# Patient Record
Sex: MALE | Race: WHITE | NOT HISPANIC OR LATINO | Employment: FULL TIME | URBAN - METROPOLITAN AREA
[De-identification: names, ages, dates, MRNs, and addresses within clinical notes are randomized per-mention and may not be internally consistent; named-entity substitution may affect disease eponyms.]

---

## 2018-07-27 ENCOUNTER — CLINICAL SUPPORT (OUTPATIENT)
Dept: OCCUPATIONAL MEDICINE | Facility: CLINIC | Age: 28
End: 2018-07-27

## 2018-07-27 PROCEDURE — 99499 UNLISTED E&M SERVICE: CPT | Mod: S$GLB,,, | Performed by: PREVENTIVE MEDICINE

## 2019-12-01 ENCOUNTER — HOSPITAL ENCOUNTER (EMERGENCY)
Facility: HOSPITAL | Age: 29
Discharge: HOME | End: 2019-12-01
Attending: EMERGENCY MEDICINE
Payer: COMMERCIAL

## 2019-12-01 VITALS
TEMPERATURE: 98 F | OXYGEN SATURATION: 99 % | SYSTOLIC BLOOD PRESSURE: 145 MMHG | DIASTOLIC BLOOD PRESSURE: 73 MMHG | WEIGHT: 225 LBS | HEART RATE: 75 BPM | BODY MASS INDEX: 33.33 KG/M2 | RESPIRATION RATE: 16 BRPM | HEIGHT: 69 IN

## 2019-12-01 DIAGNOSIS — L23.7 POISON OAK DERMATITIS: Primary | ICD-10-CM

## 2019-12-01 PROCEDURE — 63600175 PHARM REV CODE 636 W HCPCS: Mod: ER | Performed by: NURSE PRACTITIONER

## 2019-12-01 PROCEDURE — 99284 EMERGENCY DEPT VISIT MOD MDM: CPT | Mod: ER

## 2019-12-01 RX ORDER — TRIAMCINOLONE ACETONIDE 1 MG/G
OINTMENT TOPICAL 2 TIMES DAILY
Qty: 30 G | Refills: 0 | Status: SHIPPED | OUTPATIENT
Start: 2019-12-01

## 2019-12-01 RX ORDER — PREDNISONE 10 MG/1
40 TABLET ORAL DAILY
Qty: 120 TABLET | Refills: 1 | Status: SHIPPED | OUTPATIENT
Start: 2019-12-01 | End: 2019-12-06

## 2019-12-01 RX ORDER — PREDNISONE 20 MG/1
40 TABLET ORAL
Status: COMPLETED | OUTPATIENT
Start: 2019-12-01 | End: 2019-12-01

## 2019-12-01 RX ADMIN — PREDNISONE 40 MG: 20 TABLET ORAL at 11:12

## 2019-12-01 NOTE — ED PROVIDER NOTES
Encounter Date: 12/1/2019       History     Chief Complaint   Patient presents with    Rash     Pt reports poison ivy or poison oak to upper extremities and abdomen x3 days.     28 y/o male presents to the ED with complaints of poison oak rash to BUE and abdomen for 3 days.  Patient states he was in the woods and was exposed.  He has had poison ivy and oak in the past and states this rash is similar and usually needs systemic steroids.  Patient denies fever, chest pain, SOB, numbness, weakness, tingling, abdominal pain, back pain, dysuria, hematuria, nausea, vomiting, diarrhea, or any other complaints.  He has been applying OTC topical treatment with no relief.  No aggravating or alleviating factors.            Review of patient's allergies indicates:   Allergen Reactions    Augmentin [amoxicillin-pot clavulanate]     Azithromycin Hives    Cefaclor Swelling    Cefixime Swelling    Clarithromycin Swelling    Penicillins Hives    Sulfamethoxazole-trimethoprim Swelling     History reviewed. No pertinent past medical history.  History reviewed. No pertinent surgical history.  History reviewed. No pertinent family history.  Social History     Tobacco Use    Smoking status: Never Smoker    Smokeless tobacco: Former User   Substance Use Topics    Alcohol use: Yes    Drug use: Never     Review of Systems   Constitutional: Negative for chills and fever.   HENT: Negative for congestion, ear pain, rhinorrhea and sore throat.    Eyes: Negative for pain, discharge and redness.   Respiratory: Negative for cough and shortness of breath.    Cardiovascular: Negative for chest pain.   Gastrointestinal: Negative for abdominal pain, diarrhea, nausea and vomiting.   Genitourinary: Negative for dysuria.   Musculoskeletal: Negative for back pain, neck pain and neck stiffness.   Skin: Positive for rash.   Neurological: Negative for dizziness, weakness, light-headedness, numbness and headaches.   Psychiatric/Behavioral: Negative  for confusion.       Physical Exam     Initial Vitals [12/01/19 1047]   BP Pulse Resp Temp SpO2   (!) 145/73 75 16 97.7 °F (36.5 °C) 99 %      MAP       --         Physical Exam    Nursing note and vitals reviewed.  Constitutional: Vital signs are normal. He appears well-developed. He is cooperative.  Non-toxic appearance. He does not appear ill.   HENT:   Head: Normocephalic and atraumatic.   Eyes: Conjunctivae are normal.   Neck: Normal range of motion.   Cardiovascular: Normal rate and regular rhythm.   Pulmonary/Chest: Effort normal and breath sounds normal.   Abdominal: Normal appearance.   Neurological: He is alert and oriented to person, place, and time. GCS eye subscore is 4. GCS verbal subscore is 5. GCS motor subscore is 6.   Skin: Skin is warm, dry and intact. Rash noted. No purpura noted. Rash is maculopapular. Rash is not vesicular and not urticarial.   Erythematous maculopapular rash to BUE and abdomen, with no drainage or crusting   Psychiatric: He has a normal mood and affect. His speech is normal and behavior is normal. Thought content normal.         ED Course   Procedures  Labs Reviewed - No data to display       Imaging Results    None                APC / Resident Notes:   This is an evaluation of a 29 y.o. male that presents to the Emergency Department for poison oak/ivy exposure    Physical Exam shows a non-toxic, afebrile, and well appearing male. Erythematous maculopapular rash to BUE and abdomen, with no drainage or crusting    Vital signs are reassuring. If available, previous records reviewed.     My overall impression is contact dermatitis to poison ivy/oak. I considered, but at this time, do not suspect SJS, TEN, allergic reaction, shingles, chicken pox.    ED Course: Prednisone 40 mg. D/C Meds: prednisone, triamcinolone. D/C Information: f/u, medications. The diagnosis, treatment plan, instructions for follow-up and reevaluation with PCP as well as ED return precautions were discussed  and understanding was verbalized. All questions or concerns have been addressed.                                 Clinical Impression:       ICD-10-CM ICD-9-CM   1. Poison oak dermatitis L23.7 692.6         Disposition:   Disposition: Discharged  Condition: Stable                     AMILCAR Mason  12/01/19 1212

## 2019-12-01 NOTE — ED TRIAGE NOTES
Patient reports to ED with possible poison oak/ivy exposure while camping x3 days ago. Scattered, eraser-sized whelps noted to BUE's and abdomen.    Patient identifiers verified and correct for Jens Glover.    LOC: The patient is awake, alert and aware of environment with an appropriate affect, the patient is oriented x 3 and speaking appropriately.  APPEARANCE: Patient resting comfortably and in no acute distress, patient is clean and well groomed, patient's clothing is properly fastened.  SKIN: The skin is warm and dry, color consistent with ethnicity, patient has normal skin turgor and moist mucus membranes; scattered eraser-sized red whelps noted to BUE's and abdomen.   MUSCULOSKELETAL: Patient moving all extremities spontaneously, no obvious swelling or deformities noted.  RESPIRATORY: Airway is open and patent, respirations are spontaneous, patient has a normal effort and rate, no accessory muscle use noted, bilateral breath sounds clear.  CARDIAC: Patient has a normal rate and regular rhythm, no periphreal edema noted, capillary refill < 3 seconds.  ABDOMEN: Soft and non tender to palpation, no distention noted, normoactive bowel sounds present in all four quadrants.  NEUROLOGIC: PERRL, 3mm bilaterally, eyes open spontaneously, behavior appropriate to situation, follows commands, facial expression symmetrical, bilateral hand grasp equal and even, purposeful motor response noted, normal sensation in all extremities when touched with a finger.

## 2020-07-23 ENCOUNTER — OFFICE VISIT (OUTPATIENT)
Dept: URGENT CARE | Facility: CLINIC | Age: 30
End: 2020-07-23
Payer: OTHER MISCELLANEOUS

## 2020-07-23 VITALS
TEMPERATURE: 97 F | OXYGEN SATURATION: 98 % | SYSTOLIC BLOOD PRESSURE: 139 MMHG | HEART RATE: 70 BPM | RESPIRATION RATE: 18 BRPM | DIASTOLIC BLOOD PRESSURE: 83 MMHG

## 2020-07-23 DIAGNOSIS — M79.10 MUSCLE PAIN: ICD-10-CM

## 2020-07-23 DIAGNOSIS — B34.9 VIRAL SYNDROME: Primary | ICD-10-CM

## 2020-07-23 DIAGNOSIS — R05.9 COUGH: ICD-10-CM

## 2020-07-23 DIAGNOSIS — Z20.822 SUSPECTED COVID-19 VIRUS INFECTION: ICD-10-CM

## 2020-07-23 DIAGNOSIS — R06.02 SHORTNESS OF BREATH: ICD-10-CM
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
## 2020-07-23 PROCEDURE — U0003 INFECTIOUS AGENT DETECTION BY NUCLEIC ACID (DNA OR RNA); SEVERE ACUTE RESPIRATORY SYNDROME CORONAVIRUS 2 (SARS-COV-2) (CORONAVIRUS DISEASE [COVID-19]), AMPLIFIED PROBE TECHNIQUE, MAKING USE OF HIGH THROUGHPUT TECHNOLOGIES AS DESCRIBED BY CMS-2020-01-R: HCPCS

## 2020-07-23 PROCEDURE — 99203 PR OFFICE/OUTPT VISIT, NEW, LEVL III, 30-44 MIN: ICD-10-PCS | Mod: S$GLB,,, | Performed by: PHYSICIAN ASSISTANT

## 2020-07-23 PROCEDURE — 99203 OFFICE O/P NEW LOW 30 MIN: CPT | Mod: S$GLB,,, | Performed by: PHYSICIAN ASSISTANT

## 2020-07-23 NOTE — PROGRESS NOTES
Subjective:       Patient ID: Jens Glover is a 29 y.o. male.    Chief Complaint: COVID-19 Concerns    Pt complaining of muscle ache, chills, and congestions for two days.  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  That co-worker had possible exposure approximately 2 weeks ago.  Pt has not taken anything for treatment.  Pt mentioned he has a rash in left armpit a day ago, but states the rash is gone now.    URI   This is a new problem. The current episode started in the past 7 days. The problem has been waxing and waning. Associated symptoms include congestion, coughing, ear pain, headaches, nausea, a rash, sneezing and a sore throat. Pertinent negatives include no sinus pain, vomiting or wheezing. He has tried nothing for the symptoms. The treatment provided no relief.       Constitution: Positive for chills, sweating and fatigue. Negative for fever.   HENT: Positive for ear pain, congestion, sinus pressure and sore throat. Negative for sinus pain and voice change.    Neck: Positive for painful lymph nodes.   Eyes: Negative for eye redness.   Respiratory: Positive for chest tightness, cough and sputum production. Negative for bloody sputum, COPD, shortness of breath, stridor, wheezing and asthma.    Gastrointestinal: Positive for nausea. Negative for vomiting.   Musculoskeletal: Positive for muscle ache.   Skin: Positive for rash. Negative for erythema.   Allergic/Immunologic: Positive for sneezing. Negative for seasonal allergies and asthma.   Neurological: Positive for headaches.   Hematologic/Lymphatic: Positive for swollen lymph nodes.        Objective:      Physical Exam  Vitals signs and nursing note reviewed.   Constitutional:       General: He is not in acute distress.     Appearance: He is well-developed.   HENT:      Head: Normocephalic and atraumatic.      Right Ear: Tympanic membrane, ear canal and external ear normal.      Left Ear: Tympanic membrane, ear canal  and external ear normal.      Nose: Congestion and rhinorrhea present.      Mouth/Throat:      Mouth: Mucous membranes are moist.      Pharynx: Oropharynx is clear. No oropharyngeal exudate or posterior oropharyngeal erythema.   Eyes:      Conjunctiva/sclera: Conjunctivae normal.   Neck:      Musculoskeletal: Normal range of motion and neck supple.   Cardiovascular:      Rate and Rhythm: Normal rate and regular rhythm.      Heart sounds: No murmur. No friction rub. No gallop.    Pulmonary:      Effort: Pulmonary effort is normal.      Breath sounds: Normal breath sounds. No wheezing or rales.   Musculoskeletal: Normal range of motion.   Skin:     General: Skin is warm and dry.      Findings: No erythema or rash.   Neurological:      Mental Status: He is alert and oriented to person, place, and time.   Psychiatric:         Behavior: Behavior normal.         Thought Content: Thought content normal.         Judgment: Judgment normal.         Assessment:       1. Viral syndrome    2. Suspected Covid-19 Virus Infection    3. Work related injury        Plan:            Patient Instructions: (Take Tylenol or ibuprofen as directed as needed for fever/pain.  Take over-the-counter antihistamine as directed.  Take over-the-counter cough medication as directed.)   Restrictions: (Unable to return to work until the results of the COVID swab for in.)  Follow up in about 2 weeks (around 8/6/2020), or if symptoms worsen or fail to improve.

## 2020-07-23 NOTE — LETTER
[facility] [place] [address] [address]  Phone: [phone]  Fax: [phone]  [facility]: [phone]    Pt Name: Jens Glover  Injury Date: 07/23/2020   Employee ID:  Date of First Treatment: 07/23/2020   Company: [employer]      Appointment Time: 09:10 AM Arrived: 9:10 AM   Provider: Amparo Guadarrama PA-C Time Out: 9:57 AM     Office Treatment:   1. Viral syndrome    2. Suspected Covid-19 Virus Infection    3. Work related injury          Patient Instructions: (Take Tylenol or ibuprofen as directed as needed for fever/pain.  Take over-the-counter antihistamine as directed.  Take over-the-counter cough medication as directed.)    Restrictions: (Unable to return to work until the results of the COVID swab for in.)     Return Appointment: None.  Follow up if symptoms worsen or fail to improve.

## 2020-07-24 LAB — SARS-COV-2 RNA RESP QL NAA+PROBE: NOT DETECTED

## 2020-07-26 ENCOUNTER — TELEPHONE (OUTPATIENT)
Dept: URGENT CARE | Facility: CLINIC | Age: 30
End: 2020-07-26

## 2020-07-26 NOTE — TELEPHONE ENCOUNTER
Pt called.  told pt of negative COVID result.  ----- Message from Jalil Anguiano PA-C sent at 7/25/2020  9:09 AM CDT -----  Please call the patient regarding his negative COVID swab result.

## 2020-07-26 NOTE — TELEPHONE ENCOUNTER
----- Message from Jalil Anguiano PA-C sent at 7/25/2020  9:09 AM CDT -----  Please call the patient regarding his negative COVID swab result.

## 2021-11-03 ENCOUNTER — OCCUPATIONAL HEALTH (OUTPATIENT)
Dept: URGENT CARE | Facility: CLINIC | Age: 31
End: 2021-11-03

## 2021-11-03 PROCEDURE — 80306 DRUG TEST PRSMV INSTRMNT: CPT | Mod: S$GLB,,, | Performed by: PREVENTIVE MEDICINE

## 2021-11-03 PROCEDURE — 80306 PR DOT DRUG SCREENS: ICD-10-PCS | Mod: S$GLB,,, | Performed by: PREVENTIVE MEDICINE

## 2022-02-24 ENCOUNTER — OCCUPATIONAL HEALTH (OUTPATIENT)
Dept: URGENT CARE | Facility: CLINIC | Age: 32
End: 2022-02-24

## 2022-02-24 DIAGNOSIS — Z02.83 ENCOUNTER FOR DRUG SCREENING: Primary | ICD-10-CM

## 2022-02-24 PROCEDURE — 80306 DRUG TEST PRSMV INSTRMNT: CPT | Mod: S$GLB,,, | Performed by: PREVENTIVE MEDICINE

## 2022-02-24 PROCEDURE — 80306 OOH DOT DRUG SCREEN: ICD-10-PCS | Mod: S$GLB,,, | Performed by: PREVENTIVE MEDICINE

## 2023-04-11 ENCOUNTER — OCCUPATIONAL HEALTH (OUTPATIENT)
Dept: URGENT CARE | Facility: CLINIC | Age: 33
End: 2023-04-11

## 2023-04-11 DIAGNOSIS — Z00.00 PHYSICAL EXAM: Primary | ICD-10-CM

## 2023-04-11 PROCEDURE — 99499 COAST GUARD PHYSICAL: ICD-10-PCS | Mod: S$GLB,,, | Performed by: PHYSICIAN ASSISTANT

## 2023-04-11 PROCEDURE — 99499 UNLISTED E&M SERVICE: CPT | Mod: S$GLB,,, | Performed by: PHYSICIAN ASSISTANT

## 2024-11-20 ENCOUNTER — OCCUPATIONAL HEALTH (OUTPATIENT)
Dept: URGENT CARE | Facility: CLINIC | Age: 34
End: 2024-11-20

## 2024-11-20 DIAGNOSIS — Z13.9 ENCOUNTER FOR SCREENING: Primary | ICD-10-CM

## 2025-08-06 ENCOUNTER — OCCUPATIONAL HEALTH (OUTPATIENT)
Dept: URGENT CARE | Facility: CLINIC | Age: 35
End: 2025-08-06

## 2025-08-06 DIAGNOSIS — Z13.9 ENCOUNTER FOR SCREENING: Primary | ICD-10-CM
